# Patient Record
Sex: MALE | Race: WHITE | NOT HISPANIC OR LATINO
[De-identification: names, ages, dates, MRNs, and addresses within clinical notes are randomized per-mention and may not be internally consistent; named-entity substitution may affect disease eponyms.]

---

## 2017-05-25 ENCOUNTER — APPOINTMENT (OUTPATIENT)
Dept: GASTROENTEROLOGY | Facility: CLINIC | Age: 47
End: 2017-05-25

## 2017-05-25 VITALS
HEIGHT: 68 IN | DIASTOLIC BLOOD PRESSURE: 70 MMHG | SYSTOLIC BLOOD PRESSURE: 110 MMHG | TEMPERATURE: 98 F | OXYGEN SATURATION: 99 % | WEIGHT: 158 LBS | HEART RATE: 68 BPM | BODY MASS INDEX: 23.95 KG/M2

## 2017-10-24 ENCOUNTER — APPOINTMENT (OUTPATIENT)
Dept: GASTROENTEROLOGY | Facility: AMBULATORY MEDICAL SERVICES | Age: 47
End: 2017-10-24
Payer: COMMERCIAL

## 2017-10-24 PROCEDURE — 45380 COLONOSCOPY AND BIOPSY: CPT | Mod: 33

## 2018-07-03 ENCOUNTER — RX RENEWAL (OUTPATIENT)
Age: 48
End: 2018-07-03

## 2020-04-26 ENCOUNTER — RX RENEWAL (OUTPATIENT)
Age: 50
End: 2020-04-26

## 2020-12-24 ENCOUNTER — APPOINTMENT (OUTPATIENT)
Dept: GASTROENTEROLOGY | Facility: CLINIC | Age: 50
End: 2020-12-24

## 2021-01-14 ENCOUNTER — APPOINTMENT (OUTPATIENT)
Dept: GASTROENTEROLOGY | Facility: CLINIC | Age: 51
End: 2021-01-14
Payer: COMMERCIAL

## 2021-01-14 PROCEDURE — 99443: CPT

## 2021-01-19 ENCOUNTER — TRANSCRIPTION ENCOUNTER (OUTPATIENT)
Age: 51
End: 2021-01-19

## 2021-01-19 NOTE — ASSESSMENT
[FreeTextEntry1] : 1) Bloating \par \par  A low FODMAP diet was discussed with the patient at length. The patient had multiple questions all of which were answered. I recommended a nutritionist. Also recommended that the patient keep a food diary. We discussed  options such as Vegetables. Fresh fruits. Dairy that is lactose-free, and hard cheeses, or ripened/matured cheeses including... Beef, pork, chicken, fish, eggs. Avoid breadcrumbs, marinades, and sauces/gravies that may be high in FODMAPs. Soy products including tofu, tempeh. Grains.\par \par \par 2) GAS\par \par We discussed Probiotics and limiting leafy vegetables and other changes\par \par 3) GERD\par \par We discussed a low acid , high protien diet \par \par Avoid Spicey oily greasy foods\par \par 4) Change in Bowels \par \par We discussed the proper use of fiber and water intake \par \par The causes of Bowel irregularities were discussed at length  We discussed : Eat three meals each day. Do not skip meals. Gradually increase the amount of FIBER  in your diet. Choose more whole grain breads, cereals and rice. Select more raw fruits and vegetables -- eat the peel, if appropriate. Read food labels and look for the "dietary fiber" content of foods. Good sources have 2 grams of fiber or more. Drink six to eight glasses of water each day. Limit highly refined and processed foods. \par \par \par Citrucel 1 scoop daily \par \par All questions answered\par \par Reading material given \par \par F/U in office after procedure or in 8 weeks \par \par \par The above medical issues were discussed in detail and all questions answered over a 45 minute period.\par

## 2021-01-19 NOTE — HISTORY OF PRESENT ILLNESS
[Home] : at home, [unfilled] , at the time of the visit. [Medical Office: ([facility])___] : at the medical office located in  [Verbal consent obtained from patient] : the patient, [unfilled] [de-identified] : Pt doing very well..\par \par No complaints

## 2021-05-16 ENCOUNTER — RX RENEWAL (OUTPATIENT)
Age: 51
End: 2021-05-16

## 2021-10-27 DIAGNOSIS — K51.90 ULCERATIVE COLITIS, UNSPECIFIED, W/OUT COMPLICATIONS: ICD-10-CM

## 2021-10-27 RX ORDER — SODIUM SULFATE, POTASSIUM SULFATE, MAGNESIUM SULFATE 17.5; 3.13; 1.6 G/ML; G/ML; G/ML
17.5-3.13-1.6 SOLUTION, CONCENTRATE ORAL
Qty: 1 | Refills: 0 | Status: ACTIVE | COMMUNITY
Start: 2021-10-27 | End: 1900-01-01

## 2021-10-27 RX ORDER — SODIUM PICOSULFATE, MAGNESIUM OXIDE, AND ANHYDROUS CITRIC ACID 10; 3.5; 12 MG/16.2G; G/16.2G; G/16.2G
10-3.5-12 POWDER, METERED ORAL
Qty: 1 | Refills: 0 | Status: DISCONTINUED | COMMUNITY
Start: 2017-05-25 | End: 2021-10-27

## 2022-01-10 ENCOUNTER — APPOINTMENT (OUTPATIENT)
Dept: GASTROENTEROLOGY | Facility: AMBULATORY MEDICAL SERVICES | Age: 52
End: 2022-01-10
Payer: COMMERCIAL

## 2022-01-10 PROCEDURE — 45380 COLONOSCOPY AND BIOPSY: CPT

## 2023-09-18 ENCOUNTER — TRANSCRIPTION ENCOUNTER (OUTPATIENT)
Age: 53
End: 2023-09-18

## 2023-09-19 ENCOUNTER — TRANSCRIPTION ENCOUNTER (OUTPATIENT)
Age: 53
End: 2023-09-19

## 2024-02-12 ENCOUNTER — RX RENEWAL (OUTPATIENT)
Age: 54
End: 2024-02-12

## 2024-04-15 ENCOUNTER — RX RENEWAL (OUTPATIENT)
Age: 54
End: 2024-04-15

## 2024-04-15 RX ORDER — SULFASALAZINE 500 MG/1
500 TABLET ORAL TWICE DAILY
Qty: 120 | Refills: 4 | Status: ACTIVE | COMMUNITY
Start: 2017-11-22 | End: 1900-01-01

## 2024-08-07 ENCOUNTER — RX RENEWAL (OUTPATIENT)
Age: 54
End: 2024-08-07

## 2024-12-06 ENCOUNTER — RX RENEWAL (OUTPATIENT)
Age: 54
End: 2024-12-06

## 2025-03-07 ENCOUNTER — RX RENEWAL (OUTPATIENT)
Age: 55
End: 2025-03-07

## 2025-05-12 ENCOUNTER — RX RENEWAL (OUTPATIENT)
Age: 55
End: 2025-05-12

## 2025-06-06 ENCOUNTER — RX RENEWAL (OUTPATIENT)
Age: 55
End: 2025-06-06

## 2025-07-07 ENCOUNTER — RX RENEWAL (OUTPATIENT)
Age: 55
End: 2025-07-07

## 2025-08-04 ENCOUNTER — RX RENEWAL (OUTPATIENT)
Age: 55
End: 2025-08-04

## 2025-09-02 ENCOUNTER — RX RENEWAL (OUTPATIENT)
Age: 55
End: 2025-09-02